# Patient Record
Sex: MALE | Race: WHITE | NOT HISPANIC OR LATINO | Employment: OTHER | URBAN - METROPOLITAN AREA
[De-identification: names, ages, dates, MRNs, and addresses within clinical notes are randomized per-mention and may not be internally consistent; named-entity substitution may affect disease eponyms.]

---

## 2018-07-27 ENCOUNTER — NEW PATIENT COMPREHENSIVE (OUTPATIENT)
Dept: URBAN - METROPOLITAN AREA CLINIC 46 | Facility: CLINIC | Age: 68
End: 2018-07-27

## 2018-07-27 DIAGNOSIS — H25.813: ICD-10-CM

## 2018-07-27 DIAGNOSIS — H04.123: ICD-10-CM

## 2018-07-27 PROCEDURE — 1036F TOBACCO NON-USER: CPT

## 2018-07-27 PROCEDURE — G8427 DOCREV CUR MEDS BY ELIG CLIN: HCPCS

## 2018-07-27 PROCEDURE — 92015 DETERMINE REFRACTIVE STATE: CPT

## 2018-07-27 PROCEDURE — 92004 COMPRE OPH EXAM NEW PT 1/>: CPT

## 2018-07-27 ASSESSMENT — VISUAL ACUITY
OS_BAT: 20/400
OD_SC: 20/200
OS_PH: 20/40
OD_PH: 20/30
OD_BAT: 20/400
OS_SC: J12
OS_SC: 20/70
OD_SC: J12

## 2018-07-27 ASSESSMENT — TONOMETRY
OD_IOP_MMHG: 21
OS_IOP_MMHG: 21

## 2021-03-05 NOTE — PATIENT DISCUSSION
No Ilevro/PMN due to NSAID allergy. Slow taper PredForte due to history of CME and unable to take NSAIDs.

## 2021-03-05 NOTE — PATIENT DISCUSSION
Patient advised that vision following cataract surgery will be limited due to macular pathology. Patient understands the goal of [facility] will be to get his personal best distance vision without glasses which won't be perfect, but will be an improvement.

## 2021-03-05 NOTE — PATIENT DISCUSSION
Per patient- developed CME afterwards and is currently still taking Pred Acetate. Per Dr. Benjamin Moore, he is to keep using it BID up until cataract surgery. Then he will go to QID and slow taper.

## 2021-03-05 NOTE — PATIENT DISCUSSION
The patient was informed that with [address] [facility] for distance, they will need glasses for all near and intermediate activities after surgery. The patient understands there is a possibility they may need an enhancement after surgery. The patient elects [facility] OD, goal of emmetropia.

## 2021-04-28 NOTE — PATIENT DISCUSSION
The patient was informed that with [address] [facility] for distance, they will need glasses for all near and intermediate activities after surgery. The patient understands there is a possibility they may need an enhancement after surgery. The patient elects [facility] OD, goal of emmetropia.

## 2021-04-28 NOTE — PATIENT DISCUSSION
Per patient- developed CME afterwards and is currently still taking Pred Acetate. Per Dr. Toney Woo, he is to keep using it BID up until cataract surgery. Then he will go to QID and slow taper.

## 2021-04-28 NOTE — PATIENT DISCUSSION
Per patient- developed CME afterwards and is currently still taking Pred Acetate. Per Dr. Brunilda Pereira, he is to keep using it BID up until cataract surgery. Then he will go to QID and slow taper.

## 2021-04-28 NOTE — PATIENT DISCUSSION
The patient was informed that with [address] [facility] for distance, they will need glasses for all near and intermediate activities after surgery. The patient understands there is a possibility they may need an enhancement after surgery. The patient elects [facility] OD, goal of emmetropia.

## 2021-04-29 NOTE — PATIENT DISCUSSION
pt ed will SLOW taper PA every 10 days instead of weekly.  ed WILL need YAG in PO period for BCVA (may have retinal limitations due to Hx).

## 2021-04-29 NOTE — PATIENT DISCUSSION
The patient was informed that with [address] [facility] for distance, they will need glasses for all near and intermediate activities after surgery. The patient understands there is a possibility they may need an enhancement after surgery. The patient elects [facility] OD, goal of emmetropia.

## 2021-04-29 NOTE — PATIENT DISCUSSION
Per patient- developed CME afterwards and is currently still taking Pred Acetate. Per Dr. Velvet Phillips, he is to keep using it BID up until cataract surgery. Then he will go to QID and slow taper.

## 2021-05-14 NOTE — PATIENT DISCUSSION
Per patient- developed CME afterwards and is currently still taking Pred Acetate. Per Dr. Belén Martin, he is to keep using it BID up until cataract surgery. Then he will go to QID and slow taper.

## 2021-05-14 NOTE — PATIENT DISCUSSION
5/14/2021:  consult retina due to no NSAID use due to allergy.  cont PA QID OD.  may likely need an injection to help with CME.

## 2021-05-14 NOTE — PATIENT DISCUSSION
Discussed the risks/benefits of laser capsulotomy. Observation recommended until #1 above is stable but WILL need YAG for BCVA OD.

## 2021-07-15 NOTE — PROCEDURE NOTE: SURGICAL
<p>Prior to commencing surgery patient identification, surgical procedure, site, and side were confirmed by Dr. Lorenza Pickard. Following topical proparacaine anesthesia, the patient was positioned at the YAG laser, a contact lens coupled to the cornea with methylcellulose and an axial posterior capsulotomy performed without complication using 3.1 Mj x 16. Excess methylcellulose was washed from the eye, one drop of Alphagan was instilled and the patient returned to the holding area having tolerated the procedure well and without complication. </p><p> [id number]</p>

## 2023-08-09 ENCOUNTER — COMPREHENSIVE EXAM (OUTPATIENT)
Dept: URBAN - METROPOLITAN AREA CLINIC 36 | Facility: CLINIC | Age: 73
End: 2023-08-09

## 2023-08-09 DIAGNOSIS — H04.123: ICD-10-CM

## 2023-08-09 DIAGNOSIS — H35.3131: ICD-10-CM

## 2023-08-09 DIAGNOSIS — H25.813: ICD-10-CM

## 2023-08-09 PROCEDURE — 92015 DETERMINE REFRACTIVE STATE: CPT

## 2023-08-09 PROCEDURE — 92004 COMPRE OPH EXAM NEW PT 1/>: CPT

## 2023-08-09 ASSESSMENT — TONOMETRY
OD_IOP_MMHG: 12
OS_IOP_MMHG: 11

## 2023-08-09 ASSESSMENT — VISUAL ACUITY
OS_SC: J8
OU_SC: 20/25
OD_SC: J10
OD_SC: 20/40
OU_SC: J6
OD_PH: 20/20-2
OS_SC: 20/25